# Patient Record
Sex: MALE | Race: WHITE | HISPANIC OR LATINO
[De-identification: names, ages, dates, MRNs, and addresses within clinical notes are randomized per-mention and may not be internally consistent; named-entity substitution may affect disease eponyms.]

---

## 2020-10-21 ENCOUNTER — APPOINTMENT (OUTPATIENT)
Dept: HUMAN REPRODUCTION | Facility: CLINIC | Age: 50
End: 2020-10-21

## 2021-01-25 ENCOUNTER — APPOINTMENT (OUTPATIENT)
Dept: HUMAN REPRODUCTION | Facility: CLINIC | Age: 51
End: 2021-01-25
Payer: COMMERCIAL

## 2021-01-25 PROCEDURE — 99072 ADDL SUPL MATRL&STAF TM PHE: CPT

## 2021-01-25 PROCEDURE — 89322 SEMEN ANAL STRICT CRITERIA: CPT

## 2024-03-18 ENCOUNTER — APPOINTMENT (OUTPATIENT)
Dept: SURGERY | Facility: CLINIC | Age: 54
End: 2024-03-18
Payer: COMMERCIAL

## 2024-03-18 VITALS
SYSTOLIC BLOOD PRESSURE: 146 MMHG | DIASTOLIC BLOOD PRESSURE: 92 MMHG | OXYGEN SATURATION: 94 % | BODY MASS INDEX: 27.9 KG/M2 | HEIGHT: 72 IN | HEART RATE: 69 BPM | WEIGHT: 206 LBS

## 2024-03-18 DIAGNOSIS — Z87.891 PERSONAL HISTORY OF NICOTINE DEPENDENCE: ICD-10-CM

## 2024-03-18 DIAGNOSIS — K50.90 CROHN'S DISEASE, UNSPECIFIED, W/OUT COMPLICATIONS: ICD-10-CM

## 2024-03-18 PROCEDURE — 99203 OFFICE O/P NEW LOW 30 MIN: CPT

## 2024-03-18 RX ORDER — OMEPRAZOLE 40 MG/1
CAPSULE, DELAYED RELEASE ORAL
Refills: 0 | Status: ACTIVE | COMMUNITY

## 2024-03-18 RX ORDER — AMLODIPINE BESYLATE 5 MG/1
TABLET ORAL
Refills: 0 | Status: ACTIVE | COMMUNITY

## 2024-03-18 RX ORDER — MESALAMINE 1.2 G/1
TABLET, DELAYED RELEASE ORAL
Refills: 0 | Status: ACTIVE | COMMUNITY

## 2024-03-18 NOTE — CONSULT LETTER
[Dear  ___] : Dear  [unfilled], [Consult Letter:] : I had the pleasure of evaluating your patient, [unfilled]. [Please see my note below.] : Please see my note below. [Consult Closing:] : Thank you very much for allowing me to participate in the care of this patient.  If you have any questions, please do not hesitate to contact me. [Sincerely,] : Sincerely, [FreeTextEntry3] : Farzad Boateng MD, FACS

## 2024-03-18 NOTE — PLAN
[FreeTextEntry1] : Mr. SANCHEZ  was told significance of findings, options, risks and benefits were explained.    All surgical options were discussed including non-surgical treatments.   surgery is not advised as there is no palpable masses.  he was advised to  consult  a spine specialist.  Mr. SANCHEZ will follow up  if needed. Warning signs, follow up, and restrictions were discussed with the patient.  Patient advised to seek immediate medical attention with any acute change in symptoms or with the development of any new or worsening symptoms.  Patient's questions and concerns addressed to patient's satisfaction, and patient verbalized an understanding of the information discussed.

## 2024-03-18 NOTE — HISTORY OF PRESENT ILLNESS
[de-identified] : This is a 53 year  old patient who was referred by Dr. Jodi Ferrer with the chief complaint of having posterior neck  mass.  He is asymptomatic.  He reports having this condition for 3-4 years. He denies any trauma to the area.   He denies any fever or  night sweats. Appetite is good and weight is stable.  He states that recently he went to a dermatologist and was told to address it.  He wants to know if it could  be surgically  removed. Mr. SANCHEZ  is s/p US neck that showed prominent fatty tissue.  no fluid or mass.

## 2024-03-29 ENCOUNTER — APPOINTMENT (OUTPATIENT)
Dept: NEUROSURGERY | Facility: CLINIC | Age: 54
End: 2024-03-29
Payer: COMMERCIAL

## 2024-03-29 VITALS
DIASTOLIC BLOOD PRESSURE: 100 MMHG | BODY MASS INDEX: 27.9 KG/M2 | WEIGHT: 206 LBS | HEIGHT: 72 IN | SYSTOLIC BLOOD PRESSURE: 155 MMHG | TEMPERATURE: 98.6 F | OXYGEN SATURATION: 96 % | HEART RATE: 81 BPM

## 2024-03-29 PROCEDURE — 99205 OFFICE O/P NEW HI 60 MIN: CPT

## 2024-03-29 NOTE — REVIEW OF SYSTEMS
[As Noted in HPI] : as noted in HPI [Negative] : Heme/Lymph [de-identified] : Posterior neck soft tissue mass

## 2024-03-29 NOTE — PHYSICAL EXAM
[General Appearance - Alert] : alert [General Appearance - In No Acute Distress] : in no acute distress [FreeTextEntry1] : Posterior neck soft tissue mass, palpable. No oozing, or erythema  [Oriented To Time, Place, And Person] : oriented to person, place, and time [Impaired Insight] : insight and judgment were intact [Affect] : the affect was normal [Person] : oriented to person [Place] : oriented to place [Time] : oriented to time [Intact] : all sensory within normal limits bilaterally

## 2024-03-29 NOTE — ASSESSMENT
[FreeTextEntry1] : Mr. Chaudhary is a 52 y/o M with posterior neck soft tissue mass, most likely accumulation of subcutaneous tissue. Will obtain xrays. Reassured him it does not look like a lipoma however, will confirm with xrays vs u/s. Pt to f/u TTM with Dr. Merlos as he would like to discuss findings and interventions.

## 2024-03-29 NOTE — HISTORY OF PRESENT ILLNESS
[FreeTextEntry1] : soft tissue mass  [de-identified] :  53 year old patient who was referred by Dr. Boateng for evaluation of soft tissue mass on posterior neck. He is asymptomatic. He reports having this condition for 3-4 years. He denies any trauma to the area. He denies any pain with palpation to the area. Appetite is good and weight is stable. He states that recently he went to a dermatologist and was told to address it due to it being a "lipoma". However, ultrasound was performed which did not show lipoma and suggested accumulation of fat.

## 2024-04-17 PROBLEM — M79.89 SOFT TISSUE MASS: Status: ACTIVE | Noted: 2024-03-18

## 2024-04-17 NOTE — REASON FOR VISIT
[Home] : at home, [unfilled] , at the time of the visit. [Medical Office: ([facility])___] : at the medical office located in  [Verbal consent obtained from patient] : the patient, [unfilled]

## 2024-04-18 ENCOUNTER — APPOINTMENT (OUTPATIENT)
Dept: NEUROSURGERY | Facility: CLINIC | Age: 54
End: 2024-04-18
Payer: COMMERCIAL

## 2024-04-18 DIAGNOSIS — M79.89 OTHER SPECIFIED SOFT TISSUE DISORDERS: ICD-10-CM

## 2024-04-18 PROCEDURE — 99442: CPT

## 2024-04-19 NOTE — REVIEW OF SYSTEMS
[As Noted in HPI] : as noted in HPI [Negative] : Heme/Lymph [de-identified] : Posterior neck soft tissue mass

## 2024-04-19 NOTE — ASSESSMENT
[FreeTextEntry1] : Mr. Chaudhary is a 54 y/o M with posterior neck soft tissue mass, most likely accumulation of subcutaneous tissue.  We went over the results of all the films and Dr Looney's assessment. From my point of view, I do not think he needs surgery.

## 2024-04-19 NOTE — PHYSICAL EXAM
[General Appearance - Alert] : alert [General Appearance - In No Acute Distress] : in no acute distress [Oriented To Time, Place, And Person] : oriented to person, place, and time [Impaired Insight] : insight and judgment were intact [Affect] : the affect was normal [Person] : oriented to person [Place] : oriented to place [Time] : oriented to time [Intact] : all reflexes within normal limits bilaterally [FreeTextEntry1] : Posterior neck soft tissue mass, palpable. No oozing, or erythema

## 2024-04-19 NOTE — HISTORY OF PRESENT ILLNESS
[Home] : at home, [unfilled] , at the time of the visit. [Medical Office: ([facility])___] : at the medical office located in  [FreeTextEntry1] : soft tissue mass  [de-identified] : 4/18/2024 We had a telphonic visit. He stats he has a lump in the back of the neck that it is not painful and has no neruologic symptoms.   53-year-old patient who was referred by Dr. Boateng for evaluation of soft tissue mass on posterior neck. He is asymptomatic. He reported having this condition for 3-4 years. He denies any trauma to the area. He denies any pain with palpation to the area. Appetite is good and weight is stable. He states that recently he went to a dermatologist and was told to address it due to it being a "lipoma". However, ultrasound was performed which did not show lipoma and suggested accumulation of fat.